# Patient Record
Sex: FEMALE | Race: WHITE | NOT HISPANIC OR LATINO | URBAN - METROPOLITAN AREA
[De-identification: names, ages, dates, MRNs, and addresses within clinical notes are randomized per-mention and may not be internally consistent; named-entity substitution may affect disease eponyms.]

---

## 2024-02-23 ENCOUNTER — OFFICE VISIT (OUTPATIENT)
Dept: ORTHOPEDICS | Facility: CLINIC | Age: 64
End: 2024-02-23
Payer: COMMERCIAL

## 2024-02-23 VITALS — BODY MASS INDEX: 28.8 KG/M2 | HEIGHT: 62 IN | WEIGHT: 156.5 LBS

## 2024-02-23 DIAGNOSIS — M54.12 CERVICAL RADICULOPATHY: Primary | ICD-10-CM

## 2024-02-23 DIAGNOSIS — M54.16 LUMBAR RADICULOPATHY: ICD-10-CM

## 2024-02-23 PROCEDURE — 99204 OFFICE O/P NEW MOD 45 MIN: CPT | Mod: S$GLB,,, | Performed by: ORTHOPAEDIC SURGERY

## 2024-02-23 PROCEDURE — 99999 PR PBB SHADOW E&M-NEW PATIENT-LVL III: CPT | Mod: PBBFAC,,, | Performed by: ORTHOPAEDIC SURGERY

## 2024-02-28 NOTE — PROGRESS NOTES
"DATE: 2/28/2024  PATIENT: Yasmine Harmon    Attending Physician: Jose Dunn M.D.    CHIEF COMPLAINT:  Bilateral upper and lower extremity radiculopathy    HISTORY:  Yasmine Harmon is a 63 y.o. female who presents for 2 month history of bilateral upper and lower extremity paresthesias especially at night.  This began spontaneously with no inciting event she has never had anything like this before.  She is taken tizanidine and Zanaflex, but has not been to physical therapy.  The patient does endorse equivocal myelopathic symptoms such as difficulty with buttons coins keys as well as a few falls.    She does have a significant family history of neck and back problems.     Denies perineal paresthesias, bowel/bladder dysfunction.    PAST MEDICAL/SURGICAL HISTORY:  No past medical history on file.  No past surgical history on file.    Current Medications: No current outpatient medications on file.    Social History:   Social History     Socioeconomic History    Marital status:         EXAM:  Ht 5' 1.5" (1.562 m)   Wt 71 kg (156 lb 8 oz)   BMI 29.09 kg/m²     Gait: Normal station and gait, no difficulty with toe or heel walk.   Skin: Cervical skin negative for rashes, lesions, hairy patches and surgical scars.  Range of motion: Cervical range of motion is acceptable. There is minimal posterior cervical tenderness to palpation.  Spinal Balance: Global saggital and coronal spinal balance acceptable, no significant for scoliosis and kyphosis.  Musculoskeletal: No pain with the range of motion of the bilateral shoulders and elbows. Normal bulk and contour of the bilateral hands.  Neurological: Normal strength and tone in all major motor groups in the bilateral upper and lower extremities. Normal sensation to light touch in the C5-T1 and L2-S1 dermatomes bilaterally.  Deep tendon reflexes symmetric 1+ in the bilateral upper and lower extremities.  Negative Inverted Radial Reflex and Christy's bilaterally. Negative " "Babinski bilaterally.     IMAGING:   No imaging is available for review today    Body mass index is 29.09 kg/m².  No results found for: "HGBA1C"    ASSESSMENT/PLAN:  Cervical radiculopathy  -     MRI Cervical Spine Without Contrast; Future; Expected date: 02/23/2024    Lumbar radiculopathy  -     MRI Cervical Spine Without Contrast; Future; Expected date: 02/23/2024  -     MRI Lumbar Spine Without Contrast; Future; Expected date: 02/23/2024      No follow-ups on file.    Today we discussed options, I have ordered an MRI of the patient's cervical lumbar spine given her myelopathic symptoms.  We will also obtain x-rays.  I will see her back to discuss results, she can also bring in her old x-rays if possible.      "